# Patient Record
Sex: MALE | Race: BLACK OR AFRICAN AMERICAN | Employment: FULL TIME | URBAN - METROPOLITAN AREA
[De-identification: names, ages, dates, MRNs, and addresses within clinical notes are randomized per-mention and may not be internally consistent; named-entity substitution may affect disease eponyms.]

---

## 2017-07-07 ENCOUNTER — OFFICE VISIT (OUTPATIENT)
Dept: FAMILY MEDICINE | Facility: CLINIC | Age: 62
End: 2017-07-07
Payer: COMMERCIAL

## 2017-07-07 VITALS
DIASTOLIC BLOOD PRESSURE: 78 MMHG | SYSTOLIC BLOOD PRESSURE: 149 MMHG | HEART RATE: 70 BPM | BODY MASS INDEX: 24.64 KG/M2 | WEIGHT: 192 LBS | OXYGEN SATURATION: 98 % | HEIGHT: 74 IN | TEMPERATURE: 97.7 F | RESPIRATION RATE: 16 BRPM

## 2017-07-07 DIAGNOSIS — Z71.84 TRAVEL ADVICE ENCOUNTER: Primary | ICD-10-CM

## 2017-07-07 DIAGNOSIS — A09 TRAVELER'S DIARRHEA: ICD-10-CM

## 2017-07-07 PROCEDURE — 90471 IMMUNIZATION ADMIN: CPT | Performed by: FAMILY MEDICINE

## 2017-07-07 PROCEDURE — 90713 POLIOVIRUS IPV SC/IM: CPT | Performed by: FAMILY MEDICINE

## 2017-07-07 PROCEDURE — 90691 TYPHOID VACCINE IM: CPT | Performed by: FAMILY MEDICINE

## 2017-07-07 PROCEDURE — 90734 MENACWYD/MENACWYCRM VACC IM: CPT | Performed by: FAMILY MEDICINE

## 2017-07-07 PROCEDURE — 90632 HEPA VACCINE ADULT IM: CPT | Performed by: FAMILY MEDICINE

## 2017-07-07 PROCEDURE — 99401 PREV MED CNSL INDIV APPRX 15: CPT | Mod: 25 | Performed by: FAMILY MEDICINE

## 2017-07-07 PROCEDURE — 90472 IMMUNIZATION ADMIN EACH ADD: CPT | Performed by: FAMILY MEDICINE

## 2017-07-07 PROCEDURE — 90707 MMR VACCINE SC: CPT | Performed by: FAMILY MEDICINE

## 2017-07-07 PROCEDURE — 90746 HEPB VACCINE 3 DOSE ADULT IM: CPT | Performed by: FAMILY MEDICINE

## 2017-07-07 RX ORDER — AZITHROMYCIN 500 MG/1
TABLET, FILM COATED ORAL
Qty: 3 TABLET | Refills: 0 | Status: SHIPPED | OUTPATIENT
Start: 2017-07-07

## 2017-07-07 NOTE — PROGRESS NOTES
SUBJECTIVE: Erica Roberson , a 62 year old  male, presents for counseling and information regarding upcoming travel to [facility]. Special medical concerns include: asthma. He anticipates the following unusual exposures: none    Itinerary:  [place]     Departure Date: 8/15/17 Return date: 9/7/17    Reason for travel (i.e. Business, pleasure): [employer]     Visiting an urban or rural area?: no    Accommodations (i.e. hotel, hostel, friends, family, etc): hotel    Women - First day of your last period:     IMMUNIZATION HISTORY  Have you received any vaccinations in the past 4 weeks?  No  Have you ever fainted from having your blood drawn or from an injection?  No  Have you ever had a fever reaction to vaccination?  No  Have you ever had any bad reaction or side effect from any vaccination?  No  Have you ever had hepatitis A or B vaccine?  Yes  Do you live (or work closely) with anyone who has AIDS, an AIDS-like condition, any other immune disorder or who is on chemotherapy for cancer?  No  Have you received any injection of immune globulin or any blood products during the past 12 months?  No    GENERAL MEDICAL HISTORY  Do you have a medical condition that warrants maintenance medication or physician follow-up?  No  Do you have a medical condition that is stable now, but that may recur while traveling?  No  Has your spleen been removed?  No  Have you had an acute illness or a fever in the past 48 hours?  No  Are you pregnant, or might you become pregnant on this trip?  Any chance of pregnancy?  No  Are you breastfeeding?  No  Do you have HIV, AIDS, an AIDS-like condition, any other immune disorder, leukemia or cancer?  No  Do you have a severe combined immunodeficiency disease?  No  Have you had your thymus gland removed or history of problems with your thymus, such as myasthenia gravis, DiGeorge syndrome, or thymoma?  No    Do you have severe thrombocytopenia (low platelet count) or a coagulation disorder?   No  Have you ever had a convulsion, seizure, epilepsy, neurologic condition or brain infection?  No  Do you have any stomach conditions?  No  Do you have a G6PD deficiency?  No  Do you have severe renal or kidney impairment?  No  Do you have a history of psychiatric problems?  No  Do you have a problem with strange dreams and/or nightmares?  No  Do you have insomnia?  No  Do you have problems with vaginitis?  No  Do you have psoriasis?  No  Are you prone to motion sickness?  No  Have you ever had headaches, nausea, vomiting, or breathing problems from altitude exposure?  No      Past Medical History:   Diagnosis Date     Tuberculosis     25-30 years ago, recieved Tx      Immunization History   Administered Date(s) Administered     HepB-Peds 01/03/2007     Hepatitis A Vac Ped/Adol-2 Dose 01/03/2007     TD (ADULT, 7+) 04/07/1999, 05/10/1999, 02/23/2000, 01/01/2001     Tdap (Adacel,Boostrix) 04/08/2009     Varicella 04/07/1999, 05/10/1999       Current Outpatient Prescriptions   Medication Sig Dispense Refill     order for DME Equipment being ordered: rigid post op shoe left 1 Device 0     acetaminophen-codeine (TYLENOL/CODEINE #3) 300-30 MG per tablet Take 1-2 tablets by mouth every 4 hours as needed for mild pain 15 tablet 0     ibuprofen (ADVIL,MOTRIN) 200 MG tablet Take 3 tablets (600 mg) by mouth every 8 hours as needed for mild pain 30 tablet 0     No Known Allergies     EXAM: deferred    Immunizations discussed include: Hepatitis A, Hepatitis B, Typhoid, Meningococcus, Measles/Mumps/Rubella and Polio  Malaraia prophylaxis recommended: n/a  Symptomatic treatment for traveler's diarrhea: azithromycin     ASSESSMENT/PLAN:  1. Travel advice encounter  2. Traveler's diarrhea   I have reviewed general recommendations for safe travel   including: food/water precautions, insect avoidance, safe sex   practices given high prevalence of HIV and other STDs,   roadway safety. Educational materials to [facility] Traveler's health  website have been provided.    Total time 15 minutes, greater than 50 percent in counseling   and coordination of care.

## 2017-07-07 NOTE — NURSING NOTE
"Chief Complaint   Patient presents with     [facility] Clinic       Initial /78 (BP Location: Right arm, Patient Position: Chair, Cuff Size: Adult Large)  Pulse 70  Temp 97.7  F (36.5  C) (Oral)  Resp 16  Ht 6' 1.5\" (1.867 m)  Wt 192 lb (87.1 kg)  SpO2 98%  BMI 24.99 kg/m2 Estimated body mass index is 24.99 kg/(m^2) as calculated from the following:    Height as of this encounter: 6' 1.5\" (1.867 m).    Weight as of this encounter: 192 lb (87.1 kg).  Medication Reconciliation: complete   "

## 2017-07-07 NOTE — MR AVS SNAPSHOT
"              After Visit Summary   2017    Erica Roberson    MRN: [id number]           Patient Information     Date Of Birth          1955        Visit Information        Provider Department      2017 11:45 AM Yue Mir MD [facility]        Today's Diagnoses     Travel advice encounter    -  1    Traveler's diarrhea          Care Instructions    See handout provided           Follow-ups after your visit        Who to contact     If you have questions or need follow up information about today's clinic visit or your schedule please contact [facility] directly at [phone].  Normal or non-critical lab and imaging results will be communicated to you by MyChart, letter or phone within 4 business days after the clinic has received the results. If you do not hear from us within 7 days, please contact the clinic through Replenishhart or phone. If you have a critical or abnormal lab result, we will notify you by phone as soon as possible.  Submit refill requests through newScale or call your pharmacy and they will forward the refill request to us. Please allow 3 business days for your refill to be completed.          Additional Information About Your Visit        MyChart Information     newScale lets you send messages to your doctor, view your test results, renew your prescriptions, schedule appointments and more. To sign up, go to [email or web address] . Click on \"Log in\" on the left side of the screen, which will take you to the Welcome page. Then click on \"Sign up Now\" on the right side of the page.     You will be asked to enter the access code listed below, as well as some personal information. Please follow the directions to create your username and password.     Your access code is: [email or web address]  Expires: 10/5/2017 12:11 PM     Your access code will  in 90 days. If you need help or a new code, please call your [facility] or " "[phone].        Care EveryWhere ID     This is your Care EveryWhere ID. This could be used by other organizations to access your [facility] medical records  [id number]        Your Vitals Were     Pulse Temperature Respirations Height Pulse Oximetry BMI (Body Mass Index)    70 97.7  F (36.5  C) (Oral) 16 6' 1.5\" (1.867 m) 98% 24.99 kg/m2       Blood Pressure from Last 3 Encounters:   07/07/17 149/78   11/05/15 110/60   08/18/15 120/73    Weight from Last 3 Encounters:   07/07/17 192 lb (87.1 kg)   11/05/15 193 lb (87.5 kg)   08/18/15 189 lb (85.7 kg)              Today, you had the following     No orders found for display         Today's Medication Changes          These changes are accurate as of: 7/7/17 12:11 PM.  If you have any questions, ask your nurse or doctor.               Start taking these medicines.        Dose/Directions    azithromycin 500 MG tablet   Commonly known as:  ZITHROMAX   Used for:  Traveler's diarrhea   Started by:  Yue Mir MD        1 tablet daily x 3 days for traveler's diarrhea   Quantity:  3 tablet   Refills:  0            Where to get your medicines      These medications were sent to [facility] [address] [place] [address] [address] AT [facility] [address] [place]  [address], [facility] [address]     Phone:  [phone]     azithromycin 500 MG tablet                Primary Care Provider    Physician No Ref-Primary       No address on file        Equal Access to Services     BARRERA MARIE AH: Hadthais Cam, waaxda luqadaha, [place]ta [place]. So [facility] [phone].    ATENCIÓN: Si habla español, tiene a keller disposición servicios gratuitos de asistencia lingüística. Llame al [phone].    We comply with applicable federal civil rights laws and [place] laws. We do not discriminate on the basis of race, color, national origin, age, disability sex, sexual " orientation or gender identity.            Thank you!     Thank you for choosing [facility]  for your care. Our goal is always to provide you with excellent care. Hearing back from our patients is one way we can continue to improve our services. Please take a few minutes to complete the written survey that you may receive in the mail after your visit with us. Thank you!             Your Updated Medication List - Protect others around you: Learn how to safely use, store and throw away your medicines at www.disposemymeds.org.          This list is accurate as of: 7/7/17 12:11 PM.  Always use your most recent med list.                   Brand Name Dispense Instructions for use Diagnosis    acetaminophen-codeine 300-30 MG per tablet    TYLENOL/codeine #3    15 tablet    Take 1-2 tablets by mouth every 4 hours as needed for mild pain    Injury of left toe, initial encounter, Injury of foot, left, initial encounter       azithromycin 500 MG tablet    ZITHROMAX    3 tablet    1 tablet daily x 3 days for traveler's diarrhea    Traveler's diarrhea       ibuprofen 200 MG tablet    ADVIL/MOTRIN    30 tablet    Take 3 tablets (600 mg) by mouth every 8 hours as needed for mild pain        order for DME     1 Device    Equipment being ordered: rigid post op shoe left    Injury of foot, left, initial encounter

## 2017-07-11 ENCOUNTER — OFFICE VISIT (OUTPATIENT)
Dept: URGENT CARE | Facility: URGENT CARE | Age: 62
End: 2017-07-11
Payer: COMMERCIAL

## 2017-07-11 VITALS
OXYGEN SATURATION: 98 % | DIASTOLIC BLOOD PRESSURE: 64 MMHG | TEMPERATURE: 97.9 F | SYSTOLIC BLOOD PRESSURE: 118 MMHG | WEIGHT: 194.6 LBS | HEART RATE: 59 BPM

## 2017-07-11 DIAGNOSIS — K08.9 DENTAL DISORDER: Primary | ICD-10-CM

## 2017-07-11 PROCEDURE — 99202 OFFICE O/P NEW SF 15 MIN: CPT | Performed by: PHYSICIAN ASSISTANT

## 2017-07-11 RX ORDER — CHLORHEXIDINE GLUCONATE ORAL RINSE 1.2 MG/ML
15 SOLUTION DENTAL 2 TIMES DAILY
Qty: 473 ML | Refills: 0 | Status: SHIPPED | OUTPATIENT
Start: 2017-07-11

## 2017-07-11 RX ORDER — IBUPROFEN 800 MG/1
800 TABLET, FILM COATED ORAL EVERY 8 HOURS PRN
Qty: 30 TABLET | Refills: 1 | Status: SHIPPED | OUTPATIENT
Start: 2017-07-11

## 2017-07-11 RX ORDER — AMOXICILLIN 875 MG
875 TABLET ORAL 2 TIMES DAILY
Qty: 20 TABLET | Refills: 0 | Status: SHIPPED | OUTPATIENT
Start: 2017-07-11

## 2017-07-11 NOTE — NURSING NOTE
Chief Complaint   Patient presents with     Dental Pain       Initial /64  Pulse 59  Temp 97.9  F (36.6  C) (Oral)  Wt 194 lb 9.6 oz (88.3 kg)  SpO2 98% There is no height or weight on file to calculate BMI.  Medication Reconciliation: complete

## 2017-07-11 NOTE — MR AVS SNAPSHOT
"              After Visit Summary   2017    Erica Roberson    MRN: [id number]           Patient Information     Date Of Birth          1955        Visit Information        Provider Department      2017 4:45 PM Shaina Da Silva PA-C [facility]        Today's Diagnoses     Dental disorder    -  1       Follow-ups after your visit        Who to contact     If you have questions or need follow up information about today's clinic visit or your schedule please contact [facility] directly at [phone].  Normal or non-critical lab and imaging results will be communicated to you by Lancopehart, letter or phone within 4 business days after the clinic has received the results. If you do not hear from us within 7 days, please contact the clinic through Lancopehart or phone. If you have a critical or abnormal lab result, we will notify you by phone as soon as possible.  Submit refill requests through Doctor on Demand or call your pharmacy and they will forward the refill request to us. Please allow 3 business days for your refill to be completed.          Additional Information About Your Visit        MyChart Information     Doctor on Demand lets you send messages to your doctor, view your test results, renew your prescriptions, schedule appointments and more. To sign up, go to [email or web address] . Click on \"Log in\" on the left side of the screen, which will take you to the Welcome page. Then click on \"Sign up Now\" on the right side of the page.     You will be asked to enter the access code listed below, as well as some personal information. Please follow the directions to create your username and password.     Your access code is: [email or web address]  Expires: 10/9/2017  6:40 PM     Your access code will  in 90 days. If you need help or a new code, please call your [facility] or [phone].        Care EveryWhere ID     This is your Care EveryWhere ID. " This could be used by other organizations to access your [facility] medical records  [id number]        Your Vitals Were     Pulse Temperature Pulse Oximetry             59 97.9  F (36.6  C) (Oral) 98%          Blood Pressure from Last 3 Encounters:   07/11/17 118/64    Weight from Last 3 Encounters:   07/11/17 194 lb 9.6 oz (88.3 kg)              Today, you had the following     No orders found for display         Today's Medication Changes          These changes are accurate as of: 7/11/17  6:40 PM.  If you have any questions, ask your nurse or doctor.               Start taking these medicines.        Dose/Directions    amoxicillin 875 MG tablet   Commonly known as:  AMOXIL   Used for:  Dental disorder   Started by:  Shaina Da Silva PA-C        Dose:  875 mg   Take 1 tablet (875 mg) by mouth 2 times daily   Quantity:  20 tablet   Refills:  0       chlorhexidine 0.12 % solution   Commonly known as:  PERIDEX   Used for:  Dental disorder   Started by:  Shaina Da Silva PA-C        Dose:  15 mL   Swish and spit 15 mLs in mouth 2 times daily   Quantity:  473 mL   Refills:  0         These medicines have changed or have updated prescriptions.        Dose/Directions    * IBUPROFEN PO   This may have changed:  Another medication with the same name was added. Make sure you understand how and when to take each.   Changed by:  Shaina Da Silva PA-C        Refills:  0       * ibuprofen 800 MG tablet   Commonly known as:  ADVIL/MOTRIN   This may have changed:  You were already taking a medication with the same name, and this prescription was added. Make sure you understand how and when to take each.   Used for:  Dental disorder   Changed by:  Shaina Da Silva PA-C        Dose:  800 mg   Take 1 tablet (800 mg) by mouth every 8 hours as needed for moderate pain   Quantity:  30 tablet   Refills:  1       * Notice:  This list has 2 medication(s) that are the same as other medications  prescribed for you. Read the directions carefully, and ask your doctor or other care provider to review them with you.         Where to get your medicines      These medications were sent to [facility] [address] [facility] [place] - [address] AT [facility] [address]  [address], [facility] [address]     Phone:  [phone]     amoxicillin 875 MG tablet    chlorhexidine 0.12 % solution    ibuprofen 800 MG tablet                Primary Care Provider    None Specified       No primary provider on file.        Equal Access to Services     [facility]: Hadii aad ku hadasho Soomaali, waaxda luqadaha, qaybta kaalmada adeegyada, carlton musa . So [facility] [phone].    ATENCIÓN: Si habla español, tiene a keller disposición servicios gratuitos de asistencia lingüística. Llame al [phone].    We comply with applicable federal civil rights laws and [place] laws. We do not discriminate on the basis of race, color, national origin, age, disability sex, sexual orientation or gender identity.            Thank you!     Thank you for choosing [facility]  for your care. Our goal is always to provide you with excellent care. Hearing back from our patients is one way we can continue to improve our services. Please take a few minutes to complete the written survey that you may receive in the mail after your visit with us. Thank you!             Your Updated Medication List - Protect others around you: Learn how to safely use, store and throw away your medicines at www.disposemymeds.org.          This list is accurate as of: 7/11/17  6:40 PM.  Always use your most recent med list.                   Brand Name Dispense Instructions for use Diagnosis    amoxicillin 875 MG tablet    AMOXIL    20 tablet    Take 1 tablet (875 mg) by mouth 2 times daily    Dental disorder       chlorhexidine 0.12 % solution    PERIDEX    473 mL    Swish and  spit 15 mLs in mouth 2 times daily    Dental disorder       * IBUPROFEN PO           * ibuprofen 800 MG tablet    ADVIL/MOTRIN    30 tablet    Take 1 tablet (800 mg) by mouth every 8 hours as needed for moderate pain    Dental disorder       TYLENOL PO           * Notice:  This list has 2 medication(s) that are the same as other medications prescribed for you. Read the directions carefully, and ask your doctor or other care provider to review them with you.

## 2017-07-11 NOTE — PROGRESS NOTES
SUBJECTIVE:   Erica Roberson is a 62 year old male presenting with a chief complaint of   Left lower dental pain at site where he had tooth extracted 10 days ago.  No fevers.    Has noticed a smell to the area as well.     Onset of symptoms was as above.  Course of illness is worsening.    Severity moderate  Current and Associated symptoms: as above  Treatment measures tried include OTC meds.  Predisposing factors include None.    No past medical history on file.     Denies any significant PMH    There is no problem list on file for this patient.    Social History   Substance Use Topics     Smoking status: Never Smoker     Smokeless tobacco: Not on file     Alcohol use Not on file       ROS:  CONSTITUTIONAL:NEGATIVE for fever, chills, change in weight  INTEGUMENTARY/SKIN: NEGATIVE for worrisome rashes, moles or lesions  ENT/MOUTH: as per HPI    OBJECTIVE  :/64  Pulse 59  Temp 97.9  F (36.6  C) (Oral)  Wt 194 lb 9.6 oz (88.3 kg)  SpO2 98%  GENERAL APPEARANCE: healthy, alert and no distress  OP: erythematous and edematous gingival tissue at extraction site of tooth #18  NECK: supple, nontender, no lymphadenopathy  RESP: lungs clear to auscultation - no rales, rhonchi or wheezes  CV: regular rates and rhythm, normal S1 S2, no murmur noted  SKIN: no suspicious lesions or rashes    (K08.9) Dental disorder  (primary encounter diagnosis)  Comment:   Plan: chlorhexidine (PERIDEX) 0.12 % solution,         amoxicillin (AMOXIL) 875 MG tablet, ibuprofen         (ADVIL/MOTRIN) 800 MG tablet        Follow up with dentist  Patient expresses understanding and agreement with the assessment and plan as above.

## 2017-09-11 ENCOUNTER — OFFICE VISIT (OUTPATIENT)
Dept: URGENT CARE | Facility: URGENT CARE | Age: 62
End: 2017-09-11
Payer: COMMERCIAL

## 2017-09-11 VITALS
OXYGEN SATURATION: 100 % | DIASTOLIC BLOOD PRESSURE: 80 MMHG | WEIGHT: 191.8 LBS | SYSTOLIC BLOOD PRESSURE: 120 MMHG | TEMPERATURE: 98.7 F | BODY MASS INDEX: 24.96 KG/M2 | HEART RATE: 76 BPM

## 2017-09-11 DIAGNOSIS — J01.90 ACUTE SINUSITIS TREATED WITH ANTIBIOTICS IN THE PAST 60 DAYS: ICD-10-CM

## 2017-09-11 DIAGNOSIS — R09.81 CONGESTION OF PARANASAL SINUS: Primary | ICD-10-CM

## 2017-09-11 PROCEDURE — 99213 OFFICE O/P EST LOW 20 MIN: CPT | Performed by: FAMILY MEDICINE

## 2017-09-11 RX ORDER — CETIRIZINE HYDROCHLORIDE 10 MG/1
10 TABLET ORAL EVERY EVENING
Qty: 30 TABLET | Refills: 1 | Status: SHIPPED | OUTPATIENT
Start: 2017-09-11

## 2017-09-11 RX ORDER — AMOXICILLIN 500 MG/1
500 CAPSULE ORAL 3 TIMES DAILY
Qty: 30 CAPSULE | Refills: 0 | Status: SHIPPED | OUTPATIENT
Start: 2017-09-11

## 2017-09-11 RX ORDER — FLUTICASONE PROPIONATE 50 MCG
1-2 SPRAY, SUSPENSION (ML) NASAL DAILY
Qty: 1 BOTTLE | Refills: 11 | Status: SHIPPED | OUTPATIENT
Start: 2017-09-11

## 2017-09-11 NOTE — NURSING NOTE
"Chief Complaint   Patient presents with     Urgent Care     pt states fever, hedaches, sinus pressure, 2x days        Initial /80  Pulse 76  Temp 98.7  F (37.1  C) (Oral)  Wt 191 lb 12.8 oz (87 kg)  SpO2 100%  BMI 24.96 kg/m2 Estimated body mass index is 24.96 kg/(m^2) as calculated from the following:    Height as of 7/7/17: 6' 1.5\" (1.867 m).    Weight as of this encounter: 191 lb 12.8 oz (87 kg).  Medication Reconciliation: complete      "

## 2017-09-11 NOTE — PROGRESS NOTES
Chief Complaint   Patient presents with     Urgent Care     pt states fever, hedaches, sinus pressure, 2x days      SUBJECTIVE:   Erica Roberson is a 62 year old male presenting with a chief complaint of nasal congestion, sore throat and greenish sinus drainage.  Onset of symptoms was 3 day(s) ago.he also has headache too  Has h/o sinus infection too.  Course of illness is same.    Severity moderate  Current and Associated symptoms: sore throat  Treatment measures tried include OTC meds.  Predisposing factors include None.    Past Medical History:   Diagnosis Date     Tuberculosis     25-30 years ago, recieved Tx     Current Outpatient Prescriptions   Medication Sig Dispense Refill     cetirizine (ZYRTEC) 10 MG tablet Take 1 tablet (10 mg) by mouth every evening 30 tablet 1     fluticasone (FLONASE) 50 MCG/ACT spray Spray 1-2 sprays into both nostrils daily 1 Bottle 11     amoxicillin (AMOXIL) 500 MG capsule Take 1 capsule (500 mg) by mouth 3 times daily 30 capsule 0     ibuprofen (ADVIL,MOTRIN) 200 MG tablet Take 3 tablets (600 mg) by mouth every 8 hours as needed for mild pain 30 tablet 0     azithromycin (ZITHROMAX) 500 MG tablet 1 tablet daily x 3 days for traveler's diarrhea (Patient not taking: Reported on 9/11/2017) 3 tablet 0     order for DME Equipment being ordered: rigid post op shoe left (Patient not taking: Reported on 7/7/2017) 1 Device 0     acetaminophen-codeine (TYLENOL/CODEINE #3) 300-30 MG per tablet Take 1-2 tablets by mouth every 4 hours as needed for mild pain (Patient not taking: Reported on 7/7/2017) 15 tablet 0     Social History   Substance Use Topics     Smoking status: Former Smoker     Types: Cigarettes     Quit date: 3/25/2004     Smokeless tobacco: Never Used     Alcohol use No       ROS:  Review of systems negative except as stated above.    OBJECTIVE  :/80  Pulse 76  Temp 98.7  F (37.1  C) (Oral)  Wt 191 lb 12.8 oz (87 kg)  SpO2 100%  BMI 24.96 kg/m2  GENERAL  APPEARANCE: healthy, alert and no distress  EYES: EOMI,  PERRL, conjunctiva clear  HENT: ear canals and TM's normal.  Nose and mouth without ulcers, erythema or lesions  NECK: supple, nontender, no lymphadenopathy  RESP: lungs clear to auscultation - no rales, rhonchi or wheezes  CV: regular rates and rhythm, normal S1 S2, no murmur noted  ABDOMEN:  soft, nontender, no HSM or masses and bowel sounds normal  SKIN: no suspicious lesions or rashes    ASSESSMENT:  Erica was seen today for urgent care.    Diagnoses and all orders for this visit:    Congestion of paranasal sinus  -     cetirizine (ZYRTEC) 10 MG tablet; Take 1 tablet (10 mg) by mouth every evening  -     fluticasone (FLONASE) 50 MCG/ACT spray; Spray 1-2 sprays into both nostrils daily    Acute sinusitis treated with antibiotics in the past 60 days  -     amoxicillin (AMOXIL) 500 MG capsule; Take 1 capsule (500 mg) by mouth 3 times daily          PLAN:  Discussed with pt to do above meds   Discussed if symptoms dont get better in 5 days and worsens can start the antibiotic   Pt has h/o sinus infection in the past   Follow up if  symptoms fail to improve or worsens   Pt understood and agreed with plan     See orders in Epic

## 2017-09-11 NOTE — MR AVS SNAPSHOT
"              After Visit Summary   2017    Erica Roberson    MRN: [id number]           Patient Information     Date Of Birth          1955        Visit Information        Provider Department      2017 5:45 PM Isadora Duron MD [facility]        Today's Diagnoses     Congestion of paranasal sinus    -  1       Follow-ups after your visit        Who to contact     If you have questions or need follow up information about today's clinic visit or your schedule please contact [facility] directly at [phone].  Normal or non-critical lab and imaging results will be communicated to you by MyChart, letter or phone within 4 business days after the clinic has received the results. If you do not hear from us within 7 days, please contact the clinic through [employer] or phone. If you have a critical or abnormal lab result, we will notify you by phone as soon as possible.  Submit refill requests through [employer] or call your pharmacy and they will forward the refill request to us. Please allow 3 business days for your refill to be completed.          Additional Information About Your Visit        MyChart Information     [employer] lets you send messages to your doctor, view your test results, renew your prescriptions, schedule appointments and more. To sign up, go to [email or web address] . Click on \"Log in\" on the left side of the screen, which will take you to the Welcome page. Then click on \"Sign up Now\" on the right side of the page.     You will be asked to enter the access code listed below, as well as some personal information. Please follow the directions to create your username and password.     Your access code is: [email or web address]  Expires: 10/5/2017 12:11 PM     Your access code will  in 90 days. If you need help or a new code, please call your [facility] clinic or [phone].        Care EveryWhere ID     This is your Care EveryWhere ID. " This could be used by other organizations to access your [facility] medical records  [id number]        Your Vitals Were     Pulse Temperature Pulse Oximetry BMI (Body Mass Index)          76 98.7  F (37.1  C) (Oral) 100% 24.96 kg/m2         Blood Pressure from Last 3 Encounters:   09/11/17 120/80   07/07/17 149/78   11/05/15 110/60    Weight from Last 3 Encounters:   09/11/17 191 lb 12.8 oz (87 kg)   07/07/17 192 lb (87.1 kg)   11/05/15 193 lb (87.5 kg)              Today, you had the following     No orders found for display         Today's Medication Changes          These changes are accurate as of: 9/11/17  6:27 PM.  If you have any questions, ask your nurse or doctor.               Start taking these medicines.        Dose/Directions    cetirizine 10 MG tablet   Commonly known as:  zyrTEC   Used for:  Congestion of paranasal sinus   Started by:  Isadora Duron MD        Dose:  10 mg   Take 1 tablet (10 mg) by mouth every evening   Quantity:  30 tablet   Refills:  1       fluticasone 50 MCG/ACT spray   Commonly known as:  FLONASE   Used for:  Congestion of paranasal sinus   Started by:  Isadora Duron MD        Dose:  1-2 spray   Spray 1-2 sprays into both nostrils daily   Quantity:  1 Bottle   Refills:  11            Where to get your medicines      These medications were sent to [facility] [address] [place] [address] - [address] AT [facility] [address]  [address], [facility] [address]     Phone:  [phone]     cetirizine 10 MG tablet    fluticasone 50 MCG/ACT spray                Primary Care Provider    Physician No Ref-Primary       No address on file        Equal Access to Services     JOSEPH MARIE AH: Nany Cam, bayron wallace, yariel kaalmada umer, carlton ni. So [place] [phone].    ATENCIÓN: Si habla español, tiene a keller disposición servicios gratuitos de asistencia lingüística. Llame al  [phone].    We comply with applicable federal civil rights laws and [place] laws. We do not discriminate on the basis of race, color, national origin, age, disability sex, sexual orientation or gender identity.            Thank you!     Thank you for choosing [facility]  for your care. Our goal is always to provide you with excellent care. Hearing back from our patients is one way we can continue to improve our services. Please take a few minutes to complete the written survey that you may receive in the mail after your visit with us. Thank you!             Your Updated Medication List - Protect others around you: Learn how to safely use, store and throw away your medicines at www.disposemymeds.org.          This list is accurate as of: 9/11/17  6:27 PM.  Always use your most recent med list.                   Brand Name Dispense Instructions for use Diagnosis    acetaminophen-codeine 300-30 MG per tablet    TYLENOL/codeine #3    15 tablet    Take 1-2 tablets by mouth every 4 hours as needed for mild pain    Injury of left toe, initial encounter, Injury of foot, left, initial encounter       azithromycin 500 MG tablet    ZITHROMAX    3 tablet    1 tablet daily x 3 days for traveler's diarrhea    Traveler's diarrhea       cetirizine 10 MG tablet    zyrTEC    30 tablet    Take 1 tablet (10 mg) by mouth every evening    Congestion of paranasal sinus       fluticasone 50 MCG/ACT spray    FLONASE    1 Bottle    Spray 1-2 sprays into both nostrils daily    Congestion of paranasal sinus       ibuprofen 200 MG tablet    ADVIL/MOTRIN    30 tablet    Take 3 tablets (600 mg) by mouth every 8 hours as needed for mild pain        order for DME     1 Device    Equipment being ordered: rigid post op shoe left    Injury of foot, left, initial encounter